# Patient Record
Sex: FEMALE | Race: WHITE | NOT HISPANIC OR LATINO | Employment: STUDENT | URBAN - METROPOLITAN AREA
[De-identification: names, ages, dates, MRNs, and addresses within clinical notes are randomized per-mention and may not be internally consistent; named-entity substitution may affect disease eponyms.]

---

## 2023-04-07 ENCOUNTER — OFFICE VISIT (OUTPATIENT)
Dept: URGENT CARE | Facility: CLINIC | Age: 18
End: 2023-04-07
Payer: COMMERCIAL

## 2023-04-07 VITALS
HEART RATE: 82 BPM | WEIGHT: 115 LBS | SYSTOLIC BLOOD PRESSURE: 101 MMHG | OXYGEN SATURATION: 99 % | HEIGHT: 62 IN | BODY MASS INDEX: 21.16 KG/M2 | TEMPERATURE: 98 F | DIASTOLIC BLOOD PRESSURE: 62 MMHG | RESPIRATION RATE: 17 BRPM

## 2023-04-07 DIAGNOSIS — L25.9 CONTACT DERMATITIS, UNSPECIFIED CONTACT DERMATITIS TYPE, UNSPECIFIED TRIGGER: Primary | ICD-10-CM

## 2023-04-07 PROCEDURE — 99203 OFFICE O/P NEW LOW 30 MIN: CPT | Mod: S$GLB,,, | Performed by: FAMILY MEDICINE

## 2023-04-07 PROCEDURE — 99203 PR OFFICE/OUTPT VISIT, NEW, LEVL III, 30-44 MIN: ICD-10-PCS | Mod: S$GLB,,, | Performed by: FAMILY MEDICINE

## 2023-04-07 RX ORDER — PREDNISONE 20 MG/1
TABLET ORAL
Qty: 16 TABLET | Refills: 0 | Status: SHIPPED | OUTPATIENT
Start: 2023-04-07

## 2023-04-07 NOTE — PROGRESS NOTES
"Subjective:      Patient ID: Cheryl Ward is a 17 y.o. female.    Vitals:  height is 5' 2" (1.575 m) and weight is 52.2 kg (115 lb). Her temperature is 98.2 °F (36.8 °C). Her blood pressure is 101/62 and her pulse is 82. Her respiration is 17 and oxygen saturation is 99%.     Chief Complaint: Rash    Pt reports she has a rash on her left arm, face, abdomen, and back. Pt reports itchiness and dryness. Pt reports this is a reoccurring rash that occurs every couple of years that only goes away with oral or topical steroids.     Rash  This is a new problem. The current episode started in the past 7 days (4d). The problem has been gradually worsening since onset. The rash is characterized by itchiness and dryness. Treatments tried: ZYRTEC, BENADRYL, HYDROCORTISONE CREAM. The treatment provided mild relief.     Skin:  Positive for rash.    Objective:     Physical Exam   Abdominal: Normal appearance.   Neurological: She is alert.   Skin: Skin is warm, dry and rash.         Comments: Raised red excoriated skin (approx 20 cm in diameter)with swelling of rt forearm /elbow region and similar patches of left abdominal wall and slightly of left side of face   Nursing note and vitals reviewed.    Assessment:     1. Contact dermatitis, unspecified contact dermatitis type, unspecified trigger        Plan:       Contact dermatitis, unspecified contact dermatitis type, unspecified trigger  -     predniSONE (DELTASONE) 20 MG tablet; 3 tabs for 2 days then 2 tabs for 3 days then 1 tab for 3 days then 1/2 tab for 2 days  Dispense: 16 tablet; Refill: 0      Pt or guardian provided educational materials and instructions regarding their visit diagnosis.                "